# Patient Record
Sex: FEMALE | Race: WHITE | Employment: UNEMPLOYED | URBAN - METROPOLITAN AREA
[De-identification: names, ages, dates, MRNs, and addresses within clinical notes are randomized per-mention and may not be internally consistent; named-entity substitution may affect disease eponyms.]

---

## 2024-02-14 ENCOUNTER — HOSPITAL ENCOUNTER (EMERGENCY)
Age: 59
Discharge: HOME | End: 2024-02-14
Attending: EMERGENCY MEDICINE
Payer: COMMERCIAL

## 2024-02-14 VITALS
HEART RATE: 89 BPM | TEMPERATURE: 97.5 F | WEIGHT: 180 LBS | SYSTOLIC BLOOD PRESSURE: 148 MMHG | OXYGEN SATURATION: 100 % | DIASTOLIC BLOOD PRESSURE: 76 MMHG | RESPIRATION RATE: 18 BRPM

## 2024-02-14 DIAGNOSIS — H40.052 RAISED INTRAOCULAR PRESSURE OF LEFT EYE: ICD-10-CM

## 2024-02-14 DIAGNOSIS — H20.9 IRITIS: ICD-10-CM

## 2024-02-14 DIAGNOSIS — S05.02XA ABRASION OF LEFT CORNEA, INITIAL ENCOUNTER: Primary | ICD-10-CM

## 2024-02-14 PROCEDURE — 99282 EMERGENCY DEPT VISIT SF MDM: CPT

## 2024-02-14 NOTE — ED PROVIDER NOTES
Chief complaint:  High pain    HPI history provided by the patient  Patient was and states she started yesterday with a little eye irritation on the left, thought she might be getting a stye or a little conjunctivitis, does not wear contact lenses and has had no recent injury or trauma.  States throughout the night and then all day today the redness has gotten worse, she is lost her vision and has pain in the eye with swelling around the eye.  No fevers, sweats or chills and no trauma.  No headache.  No nausea vomiting or diarrhea.    Review of Systems   Constitutional:  Negative for chills, diaphoresis, fatigue and fever.   HENT:  Negative for congestion, sinus pressure and sore throat.    Eyes:  Positive for photophobia, pain, discharge, redness and visual disturbance. Negative for itching.   Respiratory:  Negative for cough, chest tightness, shortness of breath and wheezing.    Cardiovascular:  Negative for chest pain, palpitations and leg swelling.   Gastrointestinal:  Negative for abdominal pain, diarrhea, nausea and vomiting.   Genitourinary:  Negative for dysuria, flank pain and frequency.   Musculoskeletal:  Negative for arthralgias, back pain, gait problem, joint swelling, myalgias, neck pain and neck stiffness.   Skin:  Negative for rash and wound.   Neurological:  Negative for dizziness, seizures, syncope, weakness, light-headedness, numbness and headaches.   All other systems reviewed and are negative.       Physical Exam  Vitals and nursing note reviewed.   Constitutional:       General: She is awake. She is not in acute distress.     Appearance: She is well-developed. She is not ill-appearing, toxic-appearing or diaphoretic.   HENT:      Head: Normocephalic and atraumatic.   Eyes:      Intraocular pressure: Right eye pressure is 17 mmHg. Left eye pressure is 37 mmHg.      Pupils: Pupils are equal, round, and reactive to light.      Comments: Large ecchymosis left sclera overriding edge of iris, diffuse